# Patient Record
Sex: FEMALE | URBAN - METROPOLITAN AREA
[De-identification: names, ages, dates, MRNs, and addresses within clinical notes are randomized per-mention and may not be internally consistent; named-entity substitution may affect disease eponyms.]

---

## 2023-11-17 ENCOUNTER — TELEPHONE ENCOUNTER (OUTPATIENT)
Dept: URBAN - METROPOLITAN AREA CLINIC 35 | Facility: CLINIC | Age: 78
End: 2023-11-17

## 2023-11-21 ENCOUNTER — OFFICE VISIT (OUTPATIENT)
Dept: URBAN - METROPOLITAN AREA CLINIC 31 | Facility: CLINIC | Age: 78
End: 2023-11-21

## 2024-01-09 ENCOUNTER — DASHBOARD ENCOUNTERS (OUTPATIENT)
Age: 79
End: 2024-01-09

## 2024-01-09 ENCOUNTER — OFFICE VISIT (OUTPATIENT)
Dept: URBAN - METROPOLITAN AREA CLINIC 23 | Facility: CLINIC | Age: 79
End: 2024-01-09
Payer: MEDICARE

## 2024-01-09 ENCOUNTER — LAB OUTSIDE AN ENCOUNTER (OUTPATIENT)
Dept: URBAN - METROPOLITAN AREA CLINIC 23 | Facility: CLINIC | Age: 79
End: 2024-01-09

## 2024-01-09 VITALS
SYSTOLIC BLOOD PRESSURE: 165 MMHG | HEART RATE: 78 BPM | HEIGHT: 67 IN | DIASTOLIC BLOOD PRESSURE: 87 MMHG | BODY MASS INDEX: 22.44 KG/M2 | WEIGHT: 143 LBS | TEMPERATURE: 98.2 F

## 2024-01-09 DIAGNOSIS — R19.7 DIARRHEA: ICD-10-CM

## 2024-01-09 DIAGNOSIS — R19.4 CHANGE IN BOWEL HABITS: ICD-10-CM

## 2024-01-09 PROCEDURE — 99204 OFFICE O/P NEW MOD 45 MIN: CPT | Performed by: INTERNAL MEDICINE

## 2024-01-09 NOTE — HPI-TODAY'S VISIT:
78 years old female , presents with a chief complaint of increased bowel movements, which she has been experiencing four to five times daily over the past month. She describes the stool as dark and poorly formed, predominantly occurring in the mornings, and denies the presence of blood in the stool.  she has never undergone a colonoscopy.she reports no recent alterations in her medication regimen or dietary habits. She experiences mild pain and cramping coinciding with the onset of the increased bowel movements, although the discomfort is transient. She has not been prescribed or taken any antibiotics in the recent months. The patient expreses a sensation of incomplete evacuation following bowel movements and an urgent need to defecate when the sensation occurs. She has observed a decrease in the frequency of bowel movements during the last week.  She denies experiencing any weight loss attributable to the altered bowel habits and maintains a normal appetite without tendencies of overeating. Tarsha confirms that she is not taking any anticoagulant medications.

## 2024-02-07 ENCOUNTER — COLON (OUTPATIENT)
Dept: URBAN - METROPOLITAN AREA LAB 3 | Facility: LAB | Age: 79
End: 2024-02-07
Payer: MEDICARE

## 2024-02-07 DIAGNOSIS — K52.832 LYMPHOCYTIC COLITIS: ICD-10-CM

## 2024-02-07 DIAGNOSIS — K62.1 ANAL AND RECTAL POLYP: ICD-10-CM

## 2024-02-07 PROCEDURE — 45380 COLONOSCOPY AND BIOPSY: CPT | Performed by: INTERNAL MEDICINE

## 2024-03-19 ENCOUNTER — OV EP (OUTPATIENT)
Dept: URBAN - METROPOLITAN AREA CLINIC 23 | Facility: CLINIC | Age: 79
End: 2024-03-19
Payer: MEDICARE

## 2024-03-19 VITALS
DIASTOLIC BLOOD PRESSURE: 84 MMHG | SYSTOLIC BLOOD PRESSURE: 174 MMHG | HEIGHT: 67 IN | HEART RATE: 67 BPM | BODY MASS INDEX: 20.4 KG/M2 | TEMPERATURE: 97.2 F | WEIGHT: 130 LBS

## 2024-03-19 DIAGNOSIS — R19.7 DIARRHEA: ICD-10-CM

## 2024-03-19 DIAGNOSIS — K52.832 LYMPHOCYTIC COLITIS: ICD-10-CM

## 2024-03-19 PROCEDURE — 99213 OFFICE O/P EST LOW 20 MIN: CPT | Performed by: INTERNAL MEDICINE

## 2024-03-19 RX ORDER — BUDESONIDE 3 MG/1
PLEASE SEE ATTACHED FOR DETAILED DIRECTIONS CAPSULE, GELATIN COATED ORAL
Qty: 105 CAPSULE | Refills: 2

## 2024-03-19 RX ORDER — BUDESONIDE 3 MG/1
PLEASE SEE ATTACHED FOR DETAILED DIRECTIONS CAPSULE, GELATIN COATED ORAL
Qty: 105 CAPSULE | Refills: 2 | Status: ACTIVE | COMMUNITY

## 2024-03-19 NOTE — HPI-TODAY'S VISIT:
78 years old female , presents for follow-up after recently diagnosed with lymphocytic colitis.  She had colonoscopy February 2024 revealed small hyperplastic polyp normal colonic mucosa random biopsy showed lymphocytic colitis.  She started on budesonide 9 mg a day for 4 weeks then 6 mg a day for 1 week now she is on 3 mg a day finishing her last week.  She feels much better her diarrhea improved

## 2024-03-19 NOTE — PHYSICAL EXAM CONSTITUTIONAL:
well developed, well nourished , in no acute distress ,  ambulating without difficulty,  normal communication ability ,  No